# Patient Record
Sex: MALE | Employment: FULL TIME | URBAN - METROPOLITAN AREA
[De-identification: names, ages, dates, MRNs, and addresses within clinical notes are randomized per-mention and may not be internally consistent; named-entity substitution may affect disease eponyms.]

---

## 2018-11-24 ENCOUNTER — PATIENT OUTREACH (OUTPATIENT)
Dept: CARE COORDINATION | Facility: CLINIC | Age: 58
End: 2018-11-24

## 2019-03-05 ENCOUNTER — HOSPITAL ENCOUNTER (OUTPATIENT)
Facility: CLINIC | Age: 59
Setting detail: OBSERVATION
Discharge: HOME | End: 2019-03-06
Attending: EMERGENCY MEDICINE | Admitting: INTERNAL MEDICINE
Payer: COMMERCIAL

## 2019-03-05 DIAGNOSIS — K92.2 UPPER GI BLEED: ICD-10-CM

## 2019-03-05 DIAGNOSIS — I95.1 ORTHOSTASIS: ICD-10-CM

## 2019-03-05 DIAGNOSIS — Z87.19 HISTORY OF ESOPHAGEAL ULCER: ICD-10-CM

## 2019-03-05 LAB
ABO + RH BLD: NORMAL
ABO + RH BLD: NORMAL
ALBUMIN SERPL-MCNC: 3.5 G/DL (ref 3.4–5)
ALP SERPL-CCNC: 45 U/L (ref 40–150)
ALT SERPL W P-5'-P-CCNC: 21 U/L (ref 0–70)
ANION GAP SERPL CALCULATED.3IONS-SCNC: 5 MMOL/L (ref 3–14)
AST SERPL W P-5'-P-CCNC: 12 U/L (ref 0–45)
BASOPHILS # BLD AUTO: 0 10E9/L (ref 0–0.2)
BASOPHILS NFR BLD AUTO: 0.2 %
BILIRUB SERPL-MCNC: 0.5 MG/DL (ref 0.2–1.3)
BLD GP AB SCN SERPL QL: NORMAL
BLOOD BANK CMNT PATIENT-IMP: NORMAL
BUN SERPL-MCNC: 51 MG/DL (ref 7–30)
CALCIUM SERPL-MCNC: 9 MG/DL (ref 8.5–10.1)
CHLORIDE SERPL-SCNC: 112 MMOL/L (ref 94–109)
CO2 SERPL-SCNC: 25 MMOL/L (ref 20–32)
CREAT SERPL-MCNC: 0.95 MG/DL (ref 0.66–1.25)
DIFFERENTIAL METHOD BLD: ABNORMAL
EOSINOPHIL # BLD AUTO: 0 10E9/L (ref 0–0.7)
EOSINOPHIL NFR BLD AUTO: 0.2 %
ERYTHROCYTE [DISTWIDTH] IN BLOOD BY AUTOMATED COUNT: 12.9 % (ref 10–15)
GFR SERPL CREATININE-BSD FRML MDRD: 89 ML/MIN/{1.73_M2}
GLUCOSE SERPL-MCNC: 121 MG/DL (ref 70–99)
HCT VFR BLD AUTO: 36.9 % (ref 40–53)
HEMOCCULT STL QL: POSITIVE
HGB BLD-MCNC: 10.5 G/DL (ref 13.3–17.7)
HGB BLD-MCNC: 11.1 G/DL (ref 13.3–17.7)
HGB BLD-MCNC: 11.6 G/DL (ref 13.3–17.7)
HGB BLD-MCNC: 12.6 G/DL (ref 13.3–17.7)
IMM GRANULOCYTES # BLD: 0 10E9/L (ref 0–0.4)
IMM GRANULOCYTES NFR BLD: 0.3 %
LYMPHOCYTES # BLD AUTO: 1.2 10E9/L (ref 0.8–5.3)
LYMPHOCYTES NFR BLD AUTO: 10.7 %
MCH RBC QN AUTO: 29 PG (ref 26.5–33)
MCHC RBC AUTO-ENTMCNC: 34.1 G/DL (ref 31.5–36.5)
MCV RBC AUTO: 85 FL (ref 78–100)
MONOCYTES # BLD AUTO: 0.8 10E9/L (ref 0–1.3)
MONOCYTES NFR BLD AUTO: 6.8 %
NEUTROPHILS # BLD AUTO: 9 10E9/L (ref 1.6–8.3)
NEUTROPHILS NFR BLD AUTO: 81.8 %
NRBC # BLD AUTO: 0 10*3/UL
NRBC BLD AUTO-RTO: 0 /100
PLATELET # BLD AUTO: 207 10E9/L (ref 150–450)
POTASSIUM SERPL-SCNC: 4.4 MMOL/L (ref 3.4–5.3)
PROT SERPL-MCNC: 6.7 G/DL (ref 6.8–8.8)
RBC # BLD AUTO: 4.35 10E12/L (ref 4.4–5.9)
SODIUM SERPL-SCNC: 142 MMOL/L (ref 133–144)
SPECIMEN EXP DATE BLD: NORMAL
UPPER GI ENDOSCOPY: NORMAL
WBC # BLD AUTO: 11 10E9/L (ref 4–11)

## 2019-03-05 PROCEDURE — 25000128 H RX IP 250 OP 636: Performed by: INTERNAL MEDICINE

## 2019-03-05 PROCEDURE — 99220 ZZC INITIAL OBSERVATION CARE,LEVL III: CPT | Performed by: PHYSICIAN ASSISTANT

## 2019-03-05 PROCEDURE — 86900 BLOOD TYPING SEROLOGIC ABO: CPT | Performed by: EMERGENCY MEDICINE

## 2019-03-05 PROCEDURE — 80053 COMPREHEN METABOLIC PANEL: CPT | Performed by: EMERGENCY MEDICINE

## 2019-03-05 PROCEDURE — 82272 OCCULT BLD FECES 1-3 TESTS: CPT | Performed by: EMERGENCY MEDICINE

## 2019-03-05 PROCEDURE — 25800030 ZZH RX IP 258 OP 636: Performed by: PHYSICIAN ASSISTANT

## 2019-03-05 PROCEDURE — 99285 EMERGENCY DEPT VISIT HI MDM: CPT | Mod: 25

## 2019-03-05 PROCEDURE — 36415 COLL VENOUS BLD VENIPUNCTURE: CPT | Performed by: PHYSICIAN ASSISTANT

## 2019-03-05 PROCEDURE — 85018 HEMOGLOBIN: CPT | Mod: 91 | Performed by: PHYSICIAN ASSISTANT

## 2019-03-05 PROCEDURE — 43235 EGD DIAGNOSTIC BRUSH WASH: CPT | Performed by: INTERNAL MEDICINE

## 2019-03-05 PROCEDURE — 99207 ZZC CDG-CODE CATEGORY CHANGED: CPT | Performed by: PHYSICIAN ASSISTANT

## 2019-03-05 PROCEDURE — 96365 THER/PROPH/DIAG IV INF INIT: CPT

## 2019-03-05 PROCEDURE — 96376 TX/PRO/DX INJ SAME DRUG ADON: CPT

## 2019-03-05 PROCEDURE — 25000128 H RX IP 250 OP 636: Performed by: PHYSICIAN ASSISTANT

## 2019-03-05 PROCEDURE — 86850 RBC ANTIBODY SCREEN: CPT | Performed by: EMERGENCY MEDICINE

## 2019-03-05 PROCEDURE — 25800030 ZZH RX IP 258 OP 636: Performed by: EMERGENCY MEDICINE

## 2019-03-05 PROCEDURE — G0378 HOSPITAL OBSERVATION PER HR: HCPCS

## 2019-03-05 PROCEDURE — 96361 HYDRATE IV INFUSION ADD-ON: CPT

## 2019-03-05 PROCEDURE — C9113 INJ PANTOPRAZOLE SODIUM, VIA: HCPCS | Performed by: EMERGENCY MEDICINE

## 2019-03-05 PROCEDURE — G0500 MOD SEDAT ENDO SERVICE >5YRS: HCPCS | Performed by: INTERNAL MEDICINE

## 2019-03-05 PROCEDURE — 86901 BLOOD TYPING SEROLOGIC RH(D): CPT | Performed by: EMERGENCY MEDICINE

## 2019-03-05 PROCEDURE — 25000128 H RX IP 250 OP 636: Performed by: EMERGENCY MEDICINE

## 2019-03-05 PROCEDURE — C9113 INJ PANTOPRAZOLE SODIUM, VIA: HCPCS | Performed by: PHYSICIAN ASSISTANT

## 2019-03-05 PROCEDURE — 85025 COMPLETE CBC W/AUTO DIFF WBC: CPT | Performed by: EMERGENCY MEDICINE

## 2019-03-05 RX ORDER — ONDANSETRON 2 MG/ML
4 INJECTION INTRAMUSCULAR; INTRAVENOUS EVERY 6 HOURS PRN
Status: DISCONTINUED | OUTPATIENT
Start: 2019-03-05 | End: 2019-03-06 | Stop reason: HOSPADM

## 2019-03-05 RX ORDER — DIPHENHYDRAMINE HYDROCHLORIDE 50 MG/ML
INJECTION INTRAMUSCULAR; INTRAVENOUS PRN
Status: DISCONTINUED | OUTPATIENT
Start: 2019-03-05 | End: 2019-03-05 | Stop reason: HOSPADM

## 2019-03-05 RX ORDER — AMOXICILLIN 250 MG
1 CAPSULE ORAL 2 TIMES DAILY PRN
Status: DISCONTINUED | OUTPATIENT
Start: 2019-03-05 | End: 2019-03-06 | Stop reason: HOSPADM

## 2019-03-05 RX ORDER — ACETAMINOPHEN 325 MG/1
650 TABLET ORAL EVERY 4 HOURS PRN
Status: DISCONTINUED | OUTPATIENT
Start: 2019-03-05 | End: 2019-03-06 | Stop reason: HOSPADM

## 2019-03-05 RX ORDER — ONDANSETRON 4 MG/1
4 TABLET, ORALLY DISINTEGRATING ORAL EVERY 6 HOURS PRN
Status: DISCONTINUED | OUTPATIENT
Start: 2019-03-05 | End: 2019-03-06 | Stop reason: HOSPADM

## 2019-03-05 RX ORDER — PROCHLORPERAZINE MALEATE 10 MG
10 TABLET ORAL EVERY 6 HOURS PRN
Status: DISCONTINUED | OUTPATIENT
Start: 2019-03-05 | End: 2019-03-06 | Stop reason: HOSPADM

## 2019-03-05 RX ORDER — NALOXONE HYDROCHLORIDE 0.4 MG/ML
.1-.4 INJECTION, SOLUTION INTRAMUSCULAR; INTRAVENOUS; SUBCUTANEOUS
Status: DISCONTINUED | OUTPATIENT
Start: 2019-03-05 | End: 2019-03-06 | Stop reason: HOSPADM

## 2019-03-05 RX ORDER — HYDROCODONE BITARTRATE AND ACETAMINOPHEN 5; 325 MG/1; MG/1
1-2 TABLET ORAL EVERY 4 HOURS PRN
Status: DISCONTINUED | OUTPATIENT
Start: 2019-03-05 | End: 2019-03-06 | Stop reason: HOSPADM

## 2019-03-05 RX ORDER — LIDOCAINE 40 MG/G
CREAM TOPICAL
Status: DISCONTINUED | OUTPATIENT
Start: 2019-03-05 | End: 2019-03-06 | Stop reason: HOSPADM

## 2019-03-05 RX ORDER — HYDROMORPHONE HYDROCHLORIDE 1 MG/ML
0.2 INJECTION, SOLUTION INTRAMUSCULAR; INTRAVENOUS; SUBCUTANEOUS
Status: DISCONTINUED | OUTPATIENT
Start: 2019-03-05 | End: 2019-03-06 | Stop reason: HOSPADM

## 2019-03-05 RX ORDER — BISACODYL 10 MG
10 SUPPOSITORY, RECTAL RECTAL DAILY PRN
Status: DISCONTINUED | OUTPATIENT
Start: 2019-03-05 | End: 2019-03-06 | Stop reason: HOSPADM

## 2019-03-05 RX ORDER — PROCHLORPERAZINE 25 MG
25 SUPPOSITORY, RECTAL RECTAL EVERY 12 HOURS PRN
Status: DISCONTINUED | OUTPATIENT
Start: 2019-03-05 | End: 2019-03-06 | Stop reason: HOSPADM

## 2019-03-05 RX ORDER — SODIUM CHLORIDE, SODIUM LACTATE, POTASSIUM CHLORIDE, CALCIUM CHLORIDE 600; 310; 30; 20 MG/100ML; MG/100ML; MG/100ML; MG/100ML
INJECTION, SOLUTION INTRAVENOUS CONTINUOUS
Status: DISCONTINUED | OUTPATIENT
Start: 2019-03-05 | End: 2019-03-06 | Stop reason: HOSPADM

## 2019-03-05 RX ORDER — POLYETHYLENE GLYCOL 3350 17 G/17G
17 POWDER, FOR SOLUTION ORAL DAILY PRN
Status: DISCONTINUED | OUTPATIENT
Start: 2019-03-05 | End: 2019-03-06 | Stop reason: HOSPADM

## 2019-03-05 RX ORDER — ACETAMINOPHEN 650 MG/1
650 SUPPOSITORY RECTAL EVERY 4 HOURS PRN
Status: DISCONTINUED | OUTPATIENT
Start: 2019-03-05 | End: 2019-03-06 | Stop reason: HOSPADM

## 2019-03-05 RX ORDER — AMOXICILLIN 250 MG
2 CAPSULE ORAL 2 TIMES DAILY PRN
Status: DISCONTINUED | OUTPATIENT
Start: 2019-03-05 | End: 2019-03-06 | Stop reason: HOSPADM

## 2019-03-05 RX ORDER — LIDOCAINE 40 MG/G
CREAM TOPICAL
Status: DISCONTINUED | OUTPATIENT
Start: 2019-03-05 | End: 2019-03-05 | Stop reason: HOSPADM

## 2019-03-05 RX ORDER — FENTANYL CITRATE 50 UG/ML
INJECTION, SOLUTION INTRAMUSCULAR; INTRAVENOUS PRN
Status: DISCONTINUED | OUTPATIENT
Start: 2019-03-05 | End: 2019-03-05 | Stop reason: HOSPADM

## 2019-03-05 RX ORDER — FLUMAZENIL 0.1 MG/ML
0.2 INJECTION, SOLUTION INTRAVENOUS
Status: ACTIVE | OUTPATIENT
Start: 2019-03-05 | End: 2019-03-06

## 2019-03-05 RX ADMIN — PANTOPRAZOLE SODIUM 40 MG: 40 INJECTION, POWDER, FOR SOLUTION INTRAVENOUS at 20:38

## 2019-03-05 RX ADMIN — SODIUM CHLORIDE, POTASSIUM CHLORIDE, SODIUM LACTATE AND CALCIUM CHLORIDE: 600; 310; 30; 20 INJECTION, SOLUTION INTRAVENOUS at 12:13

## 2019-03-05 RX ADMIN — SODIUM CHLORIDE, POTASSIUM CHLORIDE, SODIUM LACTATE AND CALCIUM CHLORIDE: 600; 310; 30; 20 INJECTION, SOLUTION INTRAVENOUS at 20:39

## 2019-03-05 RX ADMIN — SODIUM CHLORIDE 8 MG/HR: 9 INJECTION, SOLUTION INTRAVENOUS at 10:41

## 2019-03-05 RX ADMIN — SODIUM CHLORIDE 80 MG: 9 INJECTION, SOLUTION INTRAVENOUS at 10:07

## 2019-03-05 RX ADMIN — SODIUM CHLORIDE 1000 ML: 9 INJECTION, SOLUTION INTRAVENOUS at 09:25

## 2019-03-05 ASSESSMENT — MIFFLIN-ST. JEOR: SCORE: 2174.36

## 2019-03-05 NOTE — PHARMACY-ADMISSION MEDICATION HISTORY
Admission medication history interview status for the 3/5/2019  admission is complete. See EPIC admission navigator for prior to admission medications     Medication history source reliability:Good    Actions taken by pharmacist (provider contacted, etc):None     Additional medication history information not noted on PTA med list : Patient states he does not take any medications (OTC, prescription or herbal) at this time.     Medication reconciliation/reorder completed by provider prior to medication history? No    Time spent in this activity: 15 minutes    Prior to Admission medications    Not on File

## 2019-03-05 NOTE — CONSULTS
GASTROENTEROLOGY CONSULTATION      Zachery Dennison  [address]   [place] [address]  58 year old male     Admission Date/Time: 3/5/2019  Primary Care Provider: No Ref-Primary, Physician  Referring / Attending Physician/Provider: Cali GERARD     We were asked to see the patient in consultation by Cali GERARD for evaluation of possible melena and anemia.    ASSESSMENT:    1. Possible melena - with anemia. Hgb now 11.6, down from 12.6. Reports of darker stools x 3, definite orthostatic hypotension and pre-syncope. Recent NSAID use and history of GI bleed from esophageal ulcers in 2010. No recent PPI. DDx esophageal ulcers, peptic ulcer, erosions, AVM, Dieulafoy, malignancy, less likely varices. Small bowel and lower GI sources possible.   - Also, it is unusual to be so orthostatic/pre-syncopal with a hgb drop to 11.6, would consider other etiologies, particularly if GI work-up (EGD +/- colon) negative.    RECOMMENDATIONS:  1. BID PPI  2. EGD today  3. Monitor hgb     Silas Hagen MD   [facility]  Cell [phone]  After 5 PM, please call [phone]  ________________________________________________________________________        HPI:  Zachery Dennison is a 58 year old male who has a history of an esophageal ulcer and GI bleed associated with this in 2010. Took PPI for awhile after this, but no PPI for years. He reports feeling well while at work (he is a  in [employer]) yesterday, but extreme fatigue last night after working out and this developed into to true lightheadedness, even a pre-syncopal event while on the toilet. Normal BM last evening, but overnight he had 3 episodes of dark stools. Some associated abdominal tenderness. No vomiting.     He does report a fluttering in his chest, 2 nights ago, but this resolved.     He has been taking 3 tabs of ibuprofen 2-3 times per day for 3-4 days in the last week. Also, he takes Benadryl at night.     He lost his  "14yo daughter in September 2018 due to a glioblastoma, which has been incredibly stressful for his wife and him.      Note, he did have a very minimal nose bleed/epistaxis during the above event.      ROS: A comprehensive ten point review of systems was negative aside from those in mentioned in the HPI.      PAST MEDICAL HISTORY:  1. Esophageal ulcer 2010  2. History of basal cell cancer removal    SOCIAL HISTORY:  Social History     Tobacco Use     Smoking status: Never Smoker   Substance Use Topics     Alcohol use: Not on file     Drug use: Not on file   Drinks maybe 1 beer per week. No tobacco    FAMILY HISTORY:  Family History   Problem Relation Age of Onset     Skin Cancer No family hx of      Melanoma No family hx of      ALLERGIES: No Known Allergies  MEDICATIONS: Ibuprofen as needed.  PHYSICAL EXAM:   /85 (BP Location: Right arm)   Pulse 76   Temp 97  F (36.1  C) (Oral)   Resp 18   Ht 1.93 m (6' 4\")   Wt 124.3 kg (274 lb)   SpO2 97%   BMI 33.35 kg/m     GEN: Alert, oriented x3, NAD.  ZULEYKA: AT, anicteric, OP without erythema, exudate, or ulcers.    NECK: Supple.    LYMPH: No LAD noted.  HRT: RRR, no MARIA ISABEL  LUNGS: CTA  ABD: +BS, non-tender, non-distended, no rebound or guarding.  SKIN: No rash, jaundice   NEURO: MS intact       ADDITIONAL DATA:   I reviewed the patient's new clinical lab test results.   Recent Labs   Lab Test 03/05/19  1129 03/05/19  0919   WBC  --  11.0   HGB 11.6* 12.6*   MCV  --  85   PLT  --  207     Recent Labs   Lab Test 03/05/19  0919   POTASSIUM 4.4   CHLORIDE 112*   CO2 25   BUN 51*   ANIONGAP 5     Recent Labs   Lab Test 03/05/19  0919   ALBUMIN 3.5   BILITOTAL 0.5   ALT 21   AST 12        INR   Date Value Ref Range Status   03/02/2010 1.00 0.86 - 1.14 Final        I reviewed the patient's new imaging results.             "

## 2019-03-05 NOTE — PLAN OF CARE
PRIMARY DIAGNOSIS: GI BLEED    OUTPATIENT/OBSERVATION GOALS TO BE MET BEFORE DISCHARGE  Orthostatic performed: N/A    Stable Hgb Yes.   Recent Labs   Lab Test 03/05/19  1129 03/05/19  0919   HGB 11.6* 12.6*         Resolved or declined bleeding episodes:     Appropriate testing complete: Yes    Cleared for discharge by consultants (if involved): No    Safe discharge environment identified: No    Discharge Planner Nurse   Safe discharge environment identified: No  Barriers to discharge: Yes       Entered by: Kenyatta Cosme 03/05/2019 3:57 PM     Please review provider order for any additional goals.   Nurse to notify provider when observation goals have been met and patient is ready for discharge.

## 2019-03-05 NOTE — PROGRESS NOTES
RECEIVING UNIT ED HANDOFF REVIEW    ED Nurse Handoff Report was reviewed by: Venita Emmanuel on March 5, 2019 at 10:23 AM

## 2019-03-05 NOTE — ED PROVIDER NOTES
History     Chief Complaint:  Melena    HPI   Zachery Dennison is a 58 year old male with a history of GI bleeds, but otherwise healthy, who presents for evaluation of melena. Yesterday, he started feeling increasingly fatigued and lethargic at work so he went to the gym. There, he had a normal work out, but when he finished and was soaking in the hot tub, he still felt lethargic and now lightheaded. Additionally, he had one bowel movement at the gym which he reports was normal and brown. When he got home, he continued about his normal evening routine without any bowel movements. He woke up at midnight and went to the bathroom; this time, the stool was dark brown, but not quite black, and reminiscent of his prior GI bleeds. Since then, he has had 2 more bowel movements and each one was darker with a distinct smell than the last. The strong similarities to his last GI bleed prompted him to present to the ED. Here, he reports one episode of epistaxis this morning that is atypical for him.  Other than chronic lower back pain, he denies any other symptoms, including lower extremity edema. Of note, he is not anticoagulated and reports no other medical history. He denies any steroid or NSAID use, but does report occasional alcohol use but not daily.    His last GI bleed was in 2010. At this time, he underwent an EGD and esophageal ulcers were found as a cause for his blood loss anemia. At this time, he also had one episode of hematemesis. He reports his first episode of GI bleeding was in the late 1990s and he needed a blood transfusion at this time. He has not needed one since. He states that he was never given a explanation for the esophageal ulcers.     Allergies:  NKDA    Medications:    The patient is currently on no regular medications.    Past Medical History:   Viral warts  Esophageal ulcers    Past Surgical History:    EGD (2010)    Family History:    No past pertinent family history.    Social History:  Marital  "Status:   [2]  Wife at bedside.  Negative for tobacco use.   in [place].     Review of Systems   All other systems reviewed and are negative.    Physical Exam     Patient Vitals for the past 24 hrs:   BP Temp Temp src Pulse Resp SpO2 Height   03/05/19 0921 91/62 -- -- 118 -- -- --   03/05/19 0919 (!) 131/95 -- -- 81 -- -- --   03/05/19 0904 121/84 97.7  F (36.5  C) Oral 83 18 99 % 1.93 m (6' 4\")     Physical Exam  General: male recumbent in room 1, wife Juliana at bedside  HENT: mucous membranes moist, scant dried blood in far distal aspect of right nare without active bleeding or other visible irregularity  CV: regular rate, regular rhythm, no murmur audible, no LE edema  Resp: clear throughout, normal effort, no crackles or wheezing  GI: abdomen soft and nontender, no guarding  Rectal: Normal external exam of anus  Normal rectal tone  No palpable internal masses or tenderness  Moderate amount of soft but formed very dark brown stool, no BRBPR  MSK: no bony tenderness  Skin: appropriately warm and dry  Neuro: alert, clear speech, oriented  Psych: pleasant, cooperative    Emergency Department Course   Laboratory:  CBC: WBC: 11.0, HGB: 12.6 (L0, PLT: 207  CMP: Glucose 121 (H), BUN: 51 (H), Cl: 112 (H), Protein: 6.7 (L), o/w WNL (Creatinine: 0.95)  Occult blood stool: Positive (A)  Blood type & screen: B+    Interventions:  0925 NS 1L IV  1007 Protonix, 80 mg, IV   Protonix, 8 mg/hr, IV infusion    Emergency Department Course:  Nursing notes and vitals reviewed.   (0907) I performed an exam of the patient as documented above.      IV inserted. Medicine administered as documented above. Blood drawn. This was sent to the lab for further testing, results above.     (0927) I rechecked the patient and performed a digital rectal exam, as documented above. I obtained a stool sample from the patient which was sent to lab for testing, results noted above.     (0978) I rechecked the patient and discussed " the results of his workup thus far. He also gave consent to a transfusion if needed during his stay in the hospital.     (2855) I consulted with Dr. Hennessy of the hospitalist services. They are in agreement to accept the patient for admission. At first, they accepted to a med[facility] bed, but later changed him to observation status.     Findings and plan explained to the Patient who consents to admission. Discussed the patient with Dr. Hennessy, who will admit the patient to an observation bed for further monitoring, evaluation, and treatment.     I personally reviewed the laboratory results with the Patient and answered all related questions prior to admission. ,    Impression & Plan      Medical Decision Making:  His presentation is consistent with recurrent upper GI bleed.  He has corresponding lightheadedness and is found to be orthostatic.  I suspect that his hemoglobin will be lower on recheck.  Vitals are satisfactory while in bed.  Antacid medication was initiated.  I think he requires hospitalization for close monitoring and prompt GI consultation which can be arranged by the hospitalist service.  Differential diagnosis includes a variety of upper GI causes including peptic ulcer disease, AV malformation, neoplasm, and many others.  Very low suspicion for variceal bleed.  Type and screen sent and patient verbally consented to transfusion should it be necessary, though I do not think this must be ordered immediately based on current findings.    Diagnosis:    ICD-10-CM    1. Upper GI bleed K92.2 Comprehensive metabolic panel   2. History of esophageal ulcer Z87.19    3. Orthostasis I95.1        Disposition:  Admitted to observation under the care of Dr. Hennessy    This record was created at least in part using electronic voice recognition software, so please excuse any typographical errors.    Scribe Disclosure:  I, Tia Rodriguez, am serving as a scribe on 3/5/2019 at 9:07 AM to personally document services performed  by Jakob Polo MD based on my observations and the provider's statements to me.     Tia Rodriguez  3/5/2019    EMERGENCY DEPARTMENT       Jakob Polo MD  03/05/19 1032

## 2019-03-05 NOTE — ED NOTES
"[facility]  ED Nurse Handoff Report    ED Chief complaint: Melena (Pt reports black stool, light-headed, tired since yesterday concerned has GI bleed again)      ED Diagnosis:   Final diagnoses:   Upper GI bleed   History of esophageal ulcer   Orthostasis       Code Status: not addressed     Allergies: No Known Allergies    Activity level - Baseline/Home:  Independent    Activity Level - Current:   Stand with Assist     Needed?: No    Isolation: No  Infection: Not Applicable  Bariatric?: No    Vital Signs:   Vitals:    03/05/19 0904 03/05/19 0919 03/05/19 0921   BP: 121/84 (!) 131/95 91/62   Pulse: 83 81 118   Resp: 18     Temp: 97.7  F (36.5  C)     TempSrc: Oral     SpO2: 99%     Height: 1.93 m (6' 4\")         Cardiac Rhythm: ,        Pain level: 0-10 Pain Scale: 3    Is this patient confused?: No   Does this patient have a guardian?  No         If yes, is there guardianship documents in the Epic \"Code/ACP\" activity?  No         Guardian Notified?  No  Aline - Suicide Severity Rating Scale Completed?  Yes  If yes, what color did the patient score?  White    Patient Report: Initial Complaint: light headed last night after work out and hot tub at the club, had to sit down to not faint, then today had dark stools that smell like when he has had GI bleed before ,   Focused Assessment: light headed and orthostatic here   Tests Performed: labs   Abnormal Results: see results , stool positive for blood   Treatments provided: protonix     Family Comments: wife here     OBS brochure/video discussed/provided to patient/family: Yes              Name of person given brochure if not patient: na              Relationship to patient: na    ED Medications:   Medications   pantoprazole (PROTONIX) 80 mg in sodium chloride 0.9 % 100 mL intermittent infusion (not administered)   pantoprazole (PROTONIX) 80 mg in sodium chloride 0.9 % 100 mL infusion (not administered)   0.9% sodium chloride BOLUS (1,000 " mLs Intravenous New Bag 3/5/19 3780)       Drips infusing?:  Yes    For the majority of the shift this patient was Green.   Interventions performed were na.    Severe Sepsis OR Septic Shock Diagnosis Present: No    To be done/followed up on inpatient unit:  na    ED NURSE PHONE NUMBER: [phone]

## 2019-03-05 NOTE — H&P
Admitted:     03/05/2019      PRIMARY CARE PHYSICIAN:  Dr. Ben Cuenca      CHIEF COMPLAINT:  Melena.      HISTORY OF PRESENT ILLNESS:  Meng Dennison is a 58-year-old male with a past medical history significant for previous upper GI bleed with noted esophageal ulceration, who presented to [facility] Emergency Department on 03/05/2019 due to concerns for GI bleed.      The patient indicated that he was feeling tired and rundown all day yesterday while at work.  He decided to go work out afterwards, completed a full workup with any difficulties, but became increasingly tired following this and lightheaded while in the hot tub.  The patient had a normal bowel movement while at the gym; however, later in the evening around midnight, had a darker bowel movements and then one further bowel movement several hours later that was even darker with a distinct smell that reminded him of his previous GI bleeds.  Due to this and his feeling of lightheadedness and tired, he presented to [facility] Emergency Department.      The patient was evaluated in the Emergency Department by Dr. Polo.  Evaluation included a CBC with platelet differential revealing a white count of 11, hemoglobin of 12.6, hematocrit of 36.9, platelets of 207, RBC count of 4.35 and absolute neutrophil of 9.  Blood type and screen showed B, Rh positive, antibody negative.  A comprehensive metabolic panel showed a creatinine of 0.95 with a GFR of 89, chloride of 112, BUN of 51, total protein of 6.7 and blood glucose of 121.  Occult blood was taken and positive.  The patient received 1 liter of normal saline IV fluid, 80 mg of IV Protonix, and then started on a Protonix drip.  The patient was then registered to Observation under the Hospitalist Service for continued evaluation and management.      I evaluated the patient in the Emergency Department with his wife present at bedside.  The patient indicates that for the last 2 days he has  felt increased fatigue and just feeling rundown.  He described having a flutter sensation 2 nights ago in his chest, which has since resolved.  Him and his wife both indicate that they are under stress and currently in bereavement as they lost their youngest daughter in September due to glioblastoma.      During the previous evening, the patient was taking frequent deep breaths, which is new for him, and felt as though he was anxious but does not describe this as short of breath.  He has had some slight abdominal pain in the last couple days, rating it at a 3-4/10, but denies heartburn symptoms.  Prior to yesterday he had not had any bowel movements for approximately three days but notes darkening stool starting at midnight and then a distinct odor that he remembers from his previous GI bleeds.  The patient has also been breaking out into sweats and feeling lightheaded when standing up.  He described having 4 episodes of leg cramps that occurred last night.  He normally does not have this issue.  The patient also indicates that he has had some lower back pain for the last couple of days and he has been taking ibuprofen 3 tablets 2 times to 3 times daily for the last 3-4 days, as well as Benadryl 2 mg in the evening. The patient indicates he has not been taking ibuprofen with food and on an empty stomach.      PAST MEDICAL HISTORY:  History of GI bleed with noted esophageal ulceration in 2010 and an unknown source in 2006.      PAST SURGICAL HISTORY:   1.  Vasectomy.   2.  Tonsillectomy.      PRIOR TO ADMIT MEDICATIONS:     1.  As needed ibuprofen 3 tablets 2 times to 3 times daily for the last 3-4 days.   2.  Benadryl 2 mg as needed in the evening for sleep.      ALLERGIES:  NO KNOWN DRUG ALLERGIES.      SOCIAL HISTORY:  The patient currently resides in a house in [place] with his wife.  Two of his daughters have moved out and are in college.  Unfortunately, his youngest daughter passed away in September.  The  patient is a lifelong nonsmoker.  He consumes maybe 1 beer per week, if that.  He denies street or illicit drug use.  Does not currently utilize a cane or walker.  He currently works as a .      FAMILY MEDICAL HISTORY:   1.  Maternal grandmother had colon cancer.   2.  Paternal grandmother passed away due to a CVA.   3.  Mother has a cardiac arrhythmia and had breast cancer that is currently in remission.   4.  Youngest daughter passed away in September due to glioblastoma.      REVIEW OF SYSTEMS:  A 10 point review of systems was performed.  All pertinent positives are listed in the history of present illness, otherwise is negative.      PHYSICAL EXAMINATION:   VITAL SIGNS:  Temperature is 97.7 degrees Fahrenheit with a blood pressure of 150/90, heart rate of 79 beats per minute, respiratory rate of 18, O2 saturation 100% on room air.  The patient is currently denying any pain.   GENERAL:  The patient is awake, alert and cooperative, in no apparent distress, alert and oriented x 3.   HEENT:  Normocephalic, atraumatic.  Moist mucous membranes present.  No exudates noted in the posterior pharynx.  Uvula is midline.  Eyes:  Pupils are equal, round, reactive to light.  External movements are intact.  Normal sclerae.   NECK:  Supple, normal range of motion.  No tracheal deviation.  No cervical lymphadenopathy present.   CARDIOVASCULAR:  Regular rate and rhythm.  No rubs, murmurs or gallops appreciated.   PULMONARY:  Lungs are clear to auscultation bilaterally.  No wheezes, rhonchi, or rales appreciated.   GASTROINTESTINAL:  Bowel sounds are present in all 4 quadrants.  Soft, nontender, nondistended.   NEUROLOGIC:  Cranial nerves II-XII are grossly intact.  The patient demonstrates equal sensation, coordination and strength in the upper and lower extremities bilaterally.   EXTREMITIES:  No lower extremity edema noted bilaterally.  Calves are nontender to palpation and dorsal pedal pulses are palpable  bilaterally.      ASSESSMENT AND PLAN:  Zachery Dennison is a 58-year-old male with a past medical history significant for previous gastrointestinal bleed and noted esophageal ulcers, who is being registered Observation due to suspected upper gastrointestinal bleed secondary to nonsteroidal anti-inflammatory drug use.      1.  Suspected upper gastrointestinal bleed secondary to nonsteroidal anti-inflammatory drug use:  The patient indicates that for the last 3-4 days, he has been taking 3 tablets of ibuprofen 2 times to 3 times daily on an empty stomach.  The patient with noted positive occult stool.  The patient is symptomatic with noted orthostatic hypotension being documented in the Emergency Department and also patient breaking out into a sweat when standing up.  The patient has already received a liter of normal saline in the Emergency Department, started on a Protonix drip.  We will plan to continue with the Protonix drip on the floor.  We will start lactated Ringer's at 125 mL an hour.  The patient is currently made n.p.o.  We have consulted.  [facility] for likely EGD.  We will monitor hemoglobin serially every 6 hours.  Discussed with the patient regarding ibuprofen use on an empty stomach and offered alternatives such as Tylenol 2 times daily with ibuprofen taken at lunch with a meal.   2.  Bereavement:  Patient with the recent loss of his 13 year old daughter (in Sept) due to a glioblastoma.  He has had some issues with sleep and uses benadryl PRN 2 mg.    3.  Deep venous thrombosis prophylaxis:  We will place patient on PCDs.      CODE STATUS:  The patient elected to be FULL CODE.  This was discussed with the patient and his wife.      DISPOSITION:  The patient is being registered under Observation due to suspected upper GI bleed.  Will likely discharge home later this evening versus tomorrow pending GI consult and EGD.      The patient was discussed with Dr. Mariam Hennessy, who agrees with the assessment  and plan as stated above.  Dr. Hennessy will evaluate the patient independently.         ANKUR HENNESSY MD       As dictated by MARIANNA COVINGTON PA-C            D: 2019   T: 2019   MT: NIURKA      Name:     MAYE ELIZONDO   MRN:      [id number]        Account:      [id number]   :      1960        Admitted:     2019                   Document: [id number]       cc: Ben Cuenca MD

## 2019-03-06 VITALS
RESPIRATION RATE: 16 BRPM | HEIGHT: 76 IN | BODY MASS INDEX: 33.36 KG/M2 | OXYGEN SATURATION: 100 % | SYSTOLIC BLOOD PRESSURE: 119 MMHG | HEART RATE: 85 BPM | TEMPERATURE: 97.6 F | WEIGHT: 274 LBS | DIASTOLIC BLOOD PRESSURE: 72 MMHG

## 2019-03-06 LAB
ANION GAP SERPL CALCULATED.3IONS-SCNC: 4 MMOL/L (ref 3–14)
BUN SERPL-MCNC: 30 MG/DL (ref 7–30)
CALCIUM SERPL-MCNC: 8.1 MG/DL (ref 8.5–10.1)
CHLORIDE SERPL-SCNC: 112 MMOL/L (ref 94–109)
CO2 SERPL-SCNC: 26 MMOL/L (ref 20–32)
CREAT SERPL-MCNC: 0.93 MG/DL (ref 0.66–1.25)
GFR SERPL CREATININE-BSD FRML MDRD: >90 ML/MIN/{1.73_M2}
GLUCOSE SERPL-MCNC: 105 MG/DL (ref 70–99)
HGB BLD-MCNC: 10.4 G/DL (ref 13.3–17.7)
POTASSIUM SERPL-SCNC: 4 MMOL/L (ref 3.4–5.3)
SODIUM SERPL-SCNC: 142 MMOL/L (ref 133–144)

## 2019-03-06 PROCEDURE — C9113 INJ PANTOPRAZOLE SODIUM, VIA: HCPCS | Performed by: PHYSICIAN ASSISTANT

## 2019-03-06 PROCEDURE — 99217 ZZC OBSERVATION CARE DISCHARGE: CPT | Performed by: PHYSICIAN ASSISTANT

## 2019-03-06 PROCEDURE — G0378 HOSPITAL OBSERVATION PER HR: HCPCS

## 2019-03-06 PROCEDURE — 36415 COLL VENOUS BLD VENIPUNCTURE: CPT | Performed by: PHYSICIAN ASSISTANT

## 2019-03-06 PROCEDURE — 85018 HEMOGLOBIN: CPT | Performed by: PHYSICIAN ASSISTANT

## 2019-03-06 PROCEDURE — 80048 BASIC METABOLIC PNL TOTAL CA: CPT | Performed by: PHYSICIAN ASSISTANT

## 2019-03-06 PROCEDURE — 25800030 ZZH RX IP 258 OP 636: Performed by: PHYSICIAN ASSISTANT

## 2019-03-06 PROCEDURE — 25000128 H RX IP 250 OP 636: Performed by: PHYSICIAN ASSISTANT

## 2019-03-06 RX ORDER — PANTOPRAZOLE SODIUM 40 MG/1
40 TABLET, DELAYED RELEASE ORAL 2 TIMES DAILY
Qty: 60 TABLET | Refills: 1 | Status: SHIPPED | OUTPATIENT
Start: 2019-03-06

## 2019-03-06 RX ORDER — SUCRALFATE 1 G/1
1 TABLET ORAL 4 TIMES DAILY
Qty: 120 TABLET | Refills: 0 | Status: SHIPPED | OUTPATIENT
Start: 2019-03-06 | End: 2019-04-05

## 2019-03-06 RX ADMIN — PANTOPRAZOLE SODIUM 40 MG: 40 INJECTION, POWDER, FOR SOLUTION INTRAVENOUS at 08:39

## 2019-03-06 RX ADMIN — SODIUM CHLORIDE, POTASSIUM CHLORIDE, SODIUM LACTATE AND CALCIUM CHLORIDE: 600; 310; 30; 20 INJECTION, SOLUTION INTRAVENOUS at 07:44

## 2019-03-06 NOTE — PLAN OF CARE
A/O, VSS, SBA, hgb monitored and stable so far, denies pain, lightheaded at times, denies BMs this evening, possibly discharge tomorrow.

## 2019-03-06 NOTE — DISCHARGE SUMMARY
Admit Date:     03/05/2019   Discharge Date:           PRIMARY CARE PROVIDER:  Dr. Kasper      DATE OF ADMISSION:  03/05/2019      DATE OF DISCHARGE:  03/06/2019      DISCHARGE DIAGNOSES:   1.  Upper gastrointestinal bleed secondary to esophageal ulcerations, likely secondary to nonsteroidal anti-inflammatory drug use.   2.  Bereavement.      DISCHARGE MEDICATIONS:      Review of your medicines      START taking      Dose / Directions   pantoprazole 40 MG EC tablet  Commonly known as:  PROTONIX  Used for:  Upper GI bleed      Dose:  40 mg  Take 1 tablet (40 mg) by mouth 2 times daily  Quantity:  60 tablet  Refills:  1     sucralfate 1 GM tablet  Commonly known as:  CARAFATE  Used for:  Upper GI bleed      Dose:  1 g  Take 1 tablet (1 g) by mouth 4 times daily  Quantity:  120 tablet  Refills:  0           Where to get your medicines      These medications were sent to [facility] - [address]  [address], [place] [address]    Phone:  [phone]     pantoprazole 40 MG EC tablet    sucralfate 1 GM tablet           ALLERGIES:  NO KNOWN DRUG ALLERGIES.      DISPOSITION:  Home.      FOLLOWUP RECOMMENDATIONS:     1.  The patient will follow up with primary care provider within 1 week for hospital followup.  Recommend checking a hemoglobin at that time.   2.  The patient will follow up with [facility] and they will be scheduling a repeat EGD and colonoscopy in 2 months.      ACTIVITY:  As tolerated.      DIET:  Recommend a low-fiber diet and then advance to a normal regular diet as tolerated.      ADDITIONAL DISCHARGE INSTRUCTIONS:  I advised the patient to take Tylenol 1000 mg in the morning and before bed and 600 mg of ibuprofen with lunch in the setting of acute back pain and for 3-5 day course.      CONSULTS:  GI.      LABORATORY, IMAGING, AND PROCEDURES:    1.  Routine laboratory studies that included blood type and screen, CBC with platelet, differential,  comprehensive metabolic panel, occult blood of the stool, serial hemoglobins, blood glucose check and basic metabolic panel.   2.  EGD.      PENDING RESULTS:  None.      PHYSICAL EXAMINATION ON DAY OF DISCHARGE:   VITAL SIGNS:  Temperature is 97.6 degrees Fahrenheit with a blood pressure 119/72, heart rate of 84 beats per minute, respiratory rate of 16, O2 saturation 100% on room air.  The patient is denying any pain.   GENERAL:  The patient is awake, alert, cooperative, in no apparent distress, alert and oriented x 3.     HEENT:  Normocephalic, atraumatic.  Moist mucous membranes present.  No exudates noted in the posterior pharynx.  Uvula is midline.     NECK:  Supple, normal range of motion.  No tracheal deviation.  No cervical lymphadenopathy present.    CARDIOVASCULAR:  Regular rate and rhythm.  No rubs, murmurs or gallops appreciated.  PULMONARY:  Lungs clear to auscultation bilaterally.  No wheezes, rhonchi or rales appreciated.     GASTROINTESTINAL:  Bowel sounds are present in all 4 quadrants.  Soft, nontender, nondistended.     NEUROLOGIC:  Cranial nerves II-XII are grossly intact.  The patient is seen moving all 4 extremities without any difficulty.     EXTREMITIES:  No lower extremity edema noted bilaterally.  Calves are nontender to palpation.      BRIEF HISTORY OF PRESENT ILLNESS:  Zachery Dennison is a 58-year-old male with a past medical history significant for previous GI bleeds and noted esophageal ulcers, who was registered Observation due to upper GI bleed secondary to esophageal ulcerations and NSAID use.      HOSPITAL COURSE:   1.  Upper gastrointestinal bleed secondary to esophageal ulcerations and recent nonsteroidal anti-inflammatory drug use:  The patient was evaluated by GI Service, underwent an EGD that revealed 3 esophageal ulcerations, a large amount of blood in the stomach, and some erythematous mucosa of the duodenum.  The patient was treated with a Protonix drip, which was then  transitioned to twice daily IV Protonix, and at discharge, will start Protonix 40 mg 2 times daily and sucralfate 1 mg 4 times daily.  The patient received IV fluids.  Hemoglobins were monitored and remained stable.  At time of discharge, hemoglobin is at 10.4.  The patient has been advised to follow up with primary care provider within 1 week and then will be scheduled for a followup EGD and colonoscopy in 2 months with [facility].   2.  Bereavement:  The patient had the recent loss of his 13-year-old daughter in September due to glioblastoma.  The patient has not been sleeping well and is using Benadryl 2 mg as needed, which will continue at time of discharge.  At this time, I believe the patient and the patient's wife are coping relatively well.      CODE STATUS:  FULL CODE.      The patient was discussed with Dr. Hassan, who agrees with discharge at this time.  Dr. Hassan will evaluate the patient independently.      TOTAL DISCHARGE TIME:  Less than 30 minutes.         JOSE HASSAN MD       As dictated by MARIANNA COVINGTON PA-C            D: 2019   T: 2019   MT: NIURKA      Name:     MAYE ELIZONDO   MRN:      [id number]        Account:        [id number]   :      1960           Admit Date:     2019                                  Discharge Date:       Document: [id number]       cc: Ben Cuenca MD

## 2019-03-06 NOTE — DISCHARGE SUMMARY
Patient discharged to home on 3/6/2019 at 10:02 AM . All belongings with patient. Patient education given and all questions answered. Medication regimen discussed with patient. Upcoming appointments also discussed. Patient verbalized understanding.

## 2019-03-06 NOTE — PLAN OF CARE
A&Ox4, VSS on RA. Denies pain. 1 formed dark BM overnight, pt states similar to previous BM's. Bowel sounds active. Up ad maxi. Reg diet, voiding adequately. Hemoglobin stable. IV SL. Plan to DC today with anti-ulcer medications and follow up with PCP.

## 2020-02-24 ENCOUNTER — HEALTH MAINTENANCE LETTER (OUTPATIENT)
Age: 60
End: 2020-02-24

## 2020-12-13 ENCOUNTER — HEALTH MAINTENANCE LETTER (OUTPATIENT)
Age: 60
End: 2020-12-13

## 2021-04-17 ENCOUNTER — HEALTH MAINTENANCE LETTER (OUTPATIENT)
Age: 61
End: 2021-04-17

## 2021-09-26 ENCOUNTER — HEALTH MAINTENANCE LETTER (OUTPATIENT)
Age: 61
End: 2021-09-26

## 2022-05-08 ENCOUNTER — HEALTH MAINTENANCE LETTER (OUTPATIENT)
Age: 62
End: 2022-05-08

## 2023-04-23 ENCOUNTER — HEALTH MAINTENANCE LETTER (OUTPATIENT)
Age: 63
End: 2023-04-23

## 2023-06-02 ENCOUNTER — HEALTH MAINTENANCE LETTER (OUTPATIENT)
Age: 63
End: 2023-06-02

## (undated) RX ORDER — FENTANYL CITRATE 50 UG/ML
INJECTION, SOLUTION INTRAMUSCULAR; INTRAVENOUS
Status: DISPENSED
Start: 2019-03-05

## (undated) RX ORDER — DIPHENHYDRAMINE HYDROCHLORIDE 50 MG/ML
INJECTION INTRAMUSCULAR; INTRAVENOUS
Status: DISPENSED
Start: 2019-03-05